# Patient Record
Sex: FEMALE | Race: WHITE | NOT HISPANIC OR LATINO
[De-identification: names, ages, dates, MRNs, and addresses within clinical notes are randomized per-mention and may not be internally consistent; named-entity substitution may affect disease eponyms.]

---

## 2021-03-02 ENCOUNTER — DASHBOARD ENCOUNTERS (OUTPATIENT)
Age: 81
End: 2021-03-02

## 2021-03-02 ENCOUNTER — OFFICE VISIT (OUTPATIENT)
Dept: URBAN - METROPOLITAN AREA CLINIC 23 | Facility: CLINIC | Age: 81
End: 2021-03-02
Payer: MEDICARE

## 2021-03-02 DIAGNOSIS — K63.4: ICD-10-CM

## 2021-03-02 DIAGNOSIS — N81.6 RECTOCELE: ICD-10-CM

## 2021-03-02 PROCEDURE — 99204 OFFICE O/P NEW MOD 45 MIN: CPT | Performed by: INTERNAL MEDICINE

## 2021-03-02 RX ORDER — MELATONIN 3 MG
1 TABLET AT BEDTIME AS NEEDED TABLET ORAL ONCE A DAY
Status: ACTIVE | COMMUNITY

## 2021-03-02 RX ORDER — TRAVOPROST 0.04 MG/ML
1 DROP INTO AFFECTED EYE IN THE EVENING SOLUTION/ DROPS OPHTHALMIC ONCE A DAY
Status: ACTIVE | COMMUNITY

## 2021-03-02 RX ORDER — AZELASTINE HYDROCHLORIDE 0.5 MG/ML
1 DROP INTO AFFECTED EYE SOLUTION/ DROPS INTRAOCULAR TWICE A DAY
Status: ACTIVE | COMMUNITY

## 2021-03-02 RX ORDER — MIRABEGRON 50 MG/1
1 TABLET TABLET, FILM COATED, EXTENDED RELEASE ORAL ONCE A DAY
Status: ACTIVE | COMMUNITY

## 2021-03-02 RX ORDER — LEVOTHYROXINE SODIUM 88 UG/1
1 CAPSULE IN THE MORNING ON AN EMPTY STOMACH CAPSULE ORAL ONCE A DAY
Status: ACTIVE | COMMUNITY

## 2021-03-02 RX ORDER — GUAIFENESIN 600 MG/1
1 TABLET AS NEEDED TABLET, EXTENDED RELEASE ORAL
Status: ACTIVE | COMMUNITY

## 2021-03-02 RX ORDER — OMEPRAZOLE 40 MG/1
1 CAPSULE 30 MINUTES BEFORE MORNING MEAL CAPSULE, DELAYED RELEASE ORAL ONCE A DAY
Status: ACTIVE | COMMUNITY

## 2021-03-02 RX ORDER — DOCUSATE SODIUM 100 MG/1
1 CAPSULE AS NEEDED CAPSULE ORAL ONCE A DAY
Status: ACTIVE | COMMUNITY

## 2021-03-02 RX ORDER — TRAZODONE HYDROCHLORIDE 300 MG/1
0.5 TABLET AT BEDTIME TABLET ORAL ONCE A DAY
Status: ACTIVE | COMMUNITY

## 2021-03-02 RX ORDER — ESCITALOPRAM 20 MG/1
1 TABLET TABLET, FILM COATED ORAL ONCE A DAY
Status: ACTIVE | COMMUNITY

## 2021-03-02 RX ORDER — FOLIC ACID 1 MG/1
1 TABLET TABLET ORAL ONCE A DAY
Status: ACTIVE | COMMUNITY

## 2021-03-02 RX ORDER — CARBIDOPA AND LEVODOPA 10; 100 MG/1; MG/1
1 TABLET TABLET ORAL THREE TIMES A DAY
Status: ACTIVE | COMMUNITY

## 2021-03-02 RX ORDER — BISACODYL SUPPOSITORY 10 MG/2000MG
1 SUPPOSITORY AS NEEDED SUPPOSITORY RECTAL ONCE A DAY
Status: ACTIVE | COMMUNITY

## 2021-03-02 RX ORDER — AMLODIPINE BESYLATE 2.5 MG
1 TABLET TABLET ORAL ONCE A DAY
Status: ACTIVE | COMMUNITY

## 2021-03-02 RX ORDER — LORATADINE 10 MG
1 TABLET TABLET ORAL ONCE A DAY
Status: ACTIVE | COMMUNITY

## 2021-03-02 RX ORDER — QUETIAPINE FUMARATE 25 MG/1
1 TABLET AT BEDTIME TABLET ORAL ONCE A DAY
Status: ACTIVE | COMMUNITY

## 2021-03-02 RX ORDER — ATORVASTATIN CALCIUM 20 MG/1
1 TABLET TABLET, FILM COATED ORAL ONCE A DAY
Status: ACTIVE | COMMUNITY

## 2021-03-02 NOTE — HPI-TODAY'S VISIT:
81 year old Female presented for evaluation of pelvic prolapse and rectal prolapse. She was diagnosed with rectal prolapse she has difficulty emptying the bowel she also has bladder prolapse. She report when she stand she had pressure in the pelvic area with a prolapsed rectum. She has  mucus seepage from the rectal area. she had  colonoscopy in 2015 revealed diverticulosis and small hemorrhoids. No blood of this to no nausea no vomiting she was seen by urologist was told she may need surgery for her bladder prlapse  but was told she is a high risk for surgery

## 2021-03-08 PROBLEM — 5964004: Status: ACTIVE | Noted: 2021-03-02
